# Patient Record
Sex: FEMALE | Race: WHITE | NOT HISPANIC OR LATINO | URBAN - NONMETROPOLITAN AREA
[De-identification: names, ages, dates, MRNs, and addresses within clinical notes are randomized per-mention and may not be internally consistent; named-entity substitution may affect disease eponyms.]

---

## 2017-09-19 NOTE — PATIENT DISCUSSION
Patient educated she is a candidate for all options. Patient educated on [employer] blended vision, neuro adaptation, and halos. Patient educated with custom vision goal of emmetropia will need glasses for all near and intermediate activities after surgery. Patient educated with Basic + will need Rx glasses for all focal points after surgery.

## 2018-01-10 NOTE — PROCEDURE NOTE: CLINICAL
PROCEDURE NOTE: Punctal Plugs, Extended #1 Left Lower Lid. Prior to treatment, the risks/benefits/alternatives were discussed. The patient wished to proceed with procedure. Patient tolerated procedure well. There were no complications. Post procedure instructions given. Size *. 0.4mm Angi JACK.

## 2018-01-10 NOTE — PATIENT DISCUSSION
Pt needs higher reading power. Disc with patient that she has had two stable refrations but will need a YAG 1st on the OS prior to enh.

## 2018-10-11 NOTE — PATIENT DISCUSSION
trial framed the +0.50 OS and pt really appreciated a large change. Wants enh OS for goal of -2.25. Needs approval with [facility] for lasik.

## 2018-10-29 NOTE — PROCEDURE NOTE: SURGICAL
<p>Prior to commencing surgery patient identification, surgical procedure, site, and side were confirmed by Dr. Tiffani Boss. Following topical proparacaine anesthesia, the patient was positioned at the YAG laser, a contact lens coupled to the cornea with methylcellulose and an axial posterior capsulotomy performed without complication using 3.7 Mj x 31. Excess methylcellulose was washed from the eye, one drop of Alphagan was instilled and the patient returned to the holding area having tolerated the procedure well and without complication. </p><p>MRN [id number]</p>

## 2018-11-05 NOTE — PATIENT DISCUSSION
Well healed---Pt greatly appreciated the TF of +0.75 over OS to make the goal OS of -2.75. Pt is struggling to read.

## 2020-01-06 ENCOUNTER — IMPORTED ENCOUNTER (OUTPATIENT)
Dept: URBAN - NONMETROPOLITAN AREA CLINIC 1 | Facility: CLINIC | Age: 30
End: 2020-01-06

## 2020-01-06 PROBLEM — H52.13: Noted: 2020-01-06

## 2020-01-06 PROCEDURE — 92014 COMPRE OPH EXAM EST PT 1/>: CPT

## 2020-01-06 PROCEDURE — 92310 CONTACT LENS FITTING OU: CPT

## 2020-01-06 PROCEDURE — 92015 DETERMINE REFRACTIVE STATE: CPT

## 2021-01-06 NOTE — PROCEDURE NOTE: CLINICAL
PROCEDURE NOTE: Removal of Conjunctival FB, Superficial #1 Left Upper Lid. Anesthesia: Topical. Prep: Antibiotic Drops q 5min x 3. Prior to treatment, the risks/benefits/alternatives were discussed. The patient wished to proceed with procedure. Superficial conjunctival FB removed with forcep/needle. Globe and conjunctiva intact. Patient tolerated procedure well. There were no complications. Post procedure instructions given. Ben Osapoorva

## 2021-01-06 NOTE — PATIENT DISCUSSION
Maxitrol BID OS for 1 week then stop. Disc benign and more than likely source of FB. Should resolve in 1 week. If NI then return for plug.

## 2021-01-06 NOTE — PATIENT DISCUSSION
The patient desires to have better near vision. The patient was advised on the option of i-LASIK to improve near vision. Dr. Carissa Cabrera to confirm goal with CL trial. Patient liked +0.75 held over OS three times, but when holding cell phone liked +0.25.

## 2022-04-10 ASSESSMENT — VISUAL ACUITY
OD_SC: 20/20
OS_SC: 20/25+

## 2022-04-10 ASSESSMENT — TONOMETRY
OS_IOP_MMHG: 16
OD_IOP_MMHG: 16

## 2024-04-03 NOTE — PATIENT DISCUSSION
Artificial Tears: One drop to both eyes 3-4 times daily. We recommend Systane or Refresh lubricating eye drops which can be found at any pharmacy. Pt aware surgery has been scheduled.Pt verbalized understanding.
